# Patient Record
Sex: FEMALE | Race: WHITE
[De-identification: names, ages, dates, MRNs, and addresses within clinical notes are randomized per-mention and may not be internally consistent; named-entity substitution may affect disease eponyms.]

---

## 2019-02-12 ENCOUNTER — HOSPITAL ENCOUNTER (OUTPATIENT)
Dept: HOSPITAL 82 - LAB | Age: 74
Discharge: HOME | End: 2019-02-12
Attending: INTERNAL MEDICINE
Payer: MEDICARE

## 2019-02-12 DIAGNOSIS — R73.9: ICD-10-CM

## 2019-02-12 DIAGNOSIS — E03.9: ICD-10-CM

## 2019-02-12 DIAGNOSIS — E55.9: ICD-10-CM

## 2019-02-12 DIAGNOSIS — R53.83: Primary | ICD-10-CM

## 2019-02-12 DIAGNOSIS — E78.49: ICD-10-CM

## 2019-02-12 DIAGNOSIS — Z79.899: ICD-10-CM

## 2019-02-12 LAB
ALBUMIN SERPL-MCNC: 4.5 G/DL (ref 3.2–5)
ALP SERPL-CCNC: 103 U/L (ref 38–126)
ANION GAP SERPL CALCULATED.3IONS-SCNC: 14 MMOL/L
AST SERPL-CCNC: 38 U/L (ref 9–36)
BASOPHILS NFR BLD AUTO: 1 % (ref 0–3)
BUN SERPL-MCNC: 16 MG/DL (ref 8–23)
BUN/CREAT SERPL: 21
CHLORIDE SERPL-SCNC: 101 MMOL/L (ref 95–108)
CHOLEST SERPL-MCNC: 170 MG/DL (ref 0–199)
CHOLEST/HDLC SERPL: 3.2 {RATIO}
CO2 SERPL-SCNC: 29 MMOL/L (ref 22–30)
CREAT SERPL-MCNC: 0.8 MG/DL (ref 0.5–1)
EOSINOPHIL NFR BLD AUTO: 6 % (ref 0–8)
ERYTHROCYTE [DISTWIDTH] IN BLOOD BY AUTOMATED COUNT: 13.7 % (ref 11.5–15.5)
HCT VFR BLD AUTO: 44.4 % (ref 37–47)
HDLC SERPL-MCNC: 52 MG/DL (ref 40–?)
HGB BLD-MCNC: 14.7 G/DL (ref 12–16)
IMM GRANULOCYTES NFR BLD: 0.4 % (ref 0–5)
LDLC SERPL CALC-MCNC: 94 MG/DL
LYMPHOCYTES NFR BLD: 25 % (ref 15–41)
MCH RBC QN AUTO: 30.2 PG  CALC (ref 26–32)
MCHC RBC AUTO-ENTMCNC: 33.1 G/L CALC (ref 32–36)
MCV RBC AUTO: 91.4 FL  CALC (ref 80–100)
MONOCYTES NFR BLD AUTO: 11 % (ref 2–13)
NEUTROPHILS # BLD AUTO: 4.72 THOU/UL (ref 2–7.15)
NEUTROPHILS NFR BLD AUTO: 56 % (ref 42–76)
PLATELET # BLD AUTO: 466 THOU/UL (ref 130–400)
POTASSIUM SERPL-SCNC: 4.6 MMOL/L (ref 3.5–5.1)
PROT SERPL-MCNC: 7.4 G/DL (ref 6.3–8.2)
RBC # BLD AUTO: 4.86 MILL/UL (ref 4.2–5.6)
SODIUM SERPL-SCNC: 139 MMOL/L (ref 137–146)
TRIGL SERPL-MCNC: 116 MG/DL (ref 30–149)
TSH SERPL DL<=0.05 MIU/L-ACNC: 4.52 UIU/ML (ref 0.47–4.68)
VLDLC SERPL CALC-MCNC: 23 MG/DL

## 2019-03-13 ENCOUNTER — HOSPITAL ENCOUNTER (OUTPATIENT)
Dept: HOSPITAL 82 - ULTRASND | Age: 74
Discharge: HOME | End: 2019-03-13
Attending: INTERNAL MEDICINE
Payer: MEDICARE

## 2019-03-13 DIAGNOSIS — Z90.49: ICD-10-CM

## 2019-03-13 DIAGNOSIS — R79.89: Primary | ICD-10-CM

## 2021-12-31 ENCOUNTER — HOSPITAL ENCOUNTER (EMERGENCY)
Dept: HOSPITAL 82 - ED | Age: 76
Discharge: HOME | End: 2021-12-31
Payer: MEDICARE

## 2021-12-31 VITALS — BODY MASS INDEX: 26.56 KG/M2 | HEIGHT: 63 IN | WEIGHT: 149.91 LBS

## 2021-12-31 VITALS — DIASTOLIC BLOOD PRESSURE: 91 MMHG | SYSTOLIC BLOOD PRESSURE: 137 MMHG

## 2021-12-31 DIAGNOSIS — R10.9: Primary | ICD-10-CM

## 2021-12-31 DIAGNOSIS — R59.0: ICD-10-CM

## 2021-12-31 DIAGNOSIS — I10: ICD-10-CM

## 2021-12-31 DIAGNOSIS — E78.5: ICD-10-CM

## 2021-12-31 LAB
ALBUMIN SERPL-MCNC: 4.3 G/DL (ref 3.2–5)
ALP SERPL-CCNC: 129 U/L (ref 38–126)
ANION GAP SERPL CALCULATED.3IONS-SCNC: 10 MMOL/L
AST SERPL-CCNC: 43 U/L (ref 9–36)
BASOPHILS NFR BLD AUTO: 1 % (ref 0–3)
BILIRUB UR QL STRIP.AUTO: NEGATIVE
BUN SERPL-MCNC: 8 MG/DL (ref 8–23)
BUN/CREAT SERPL: 13
CHLORIDE SERPL-SCNC: 105 MMOL/L (ref 95–108)
CO2 SERPL-SCNC: 27 MMOL/L (ref 22–30)
COLOR UR AUTO: YELLOW
CREAT SERPL-MCNC: 0.6 MG/DL (ref 0.5–1)
EOSINOPHIL NFR BLD AUTO: 3 % (ref 0–8)
ERYTHROCYTE [DISTWIDTH] IN BLOOD BY AUTOMATED COUNT: 12.9 % (ref 11.5–15.5)
GLUCOSE UR STRIP.AUTO-MCNC: NEGATIVE MG/DL
HCT VFR BLD AUTO: 44.9 % (ref 37–47)
HGB BLD-MCNC: 14.9 G/DL (ref 12–16)
HGB UR QL STRIP.AUTO: NEGATIVE
IMM GRANULOCYTES NFR BLD: 0.2 % (ref 0–5)
KETONES UR STRIP.AUTO-MCNC: NEGATIVE MG/DL
LEUKOCYTE ESTERASE UR QL STRIP.AUTO: NEGATIVE
LIPASE SERPL-CCNC: 78 U/L (ref 23–300)
LYMPHOCYTES NFR BLD: 21 % (ref 15–41)
MCH RBC QN AUTO: 30.7 PG  CALC (ref 26–32)
MCHC RBC AUTO-ENTMCNC: 33.2 G/DL CAL (ref 32–36)
MCV RBC AUTO: 92.6 FL  CALC (ref 80–100)
MONOCYTES NFR BLD AUTO: 12 % (ref 2–13)
NEUTROPHILS # BLD AUTO: 5.27 THOU/UL (ref 2–7.15)
NEUTROPHILS NFR BLD AUTO: 63 % (ref 42–76)
NITRITE UR QL STRIP.AUTO: NEGATIVE
PH UR STRIP.AUTO: 6 [PH] (ref 4.5–8)
PLATELET # BLD AUTO: 388 THOU/UL (ref 130–400)
POTASSIUM SERPL-SCNC: 4.1 MMOL/L (ref 3.5–5.1)
PROT SERPL-MCNC: 8 G/DL (ref 6.3–8.2)
PROT UR QL STRIP.AUTO: NEGATIVE MG/DL
RBC # BLD AUTO: 4.85 MILL/UL (ref 4.2–5.6)
SODIUM SERPL-SCNC: 138 MMOL/L (ref 137–146)
SP GR UR STRIP.AUTO: 1.01
UROBILINOGEN UR QL STRIP.AUTO: 0.2 E.U./DL

## 2022-07-15 ENCOUNTER — HOSPITAL ENCOUNTER (EMERGENCY)
Dept: HOSPITAL 82 - ED | Age: 77
Discharge: HOME | End: 2022-07-15
Payer: MEDICARE

## 2022-07-15 VITALS — WEIGHT: 149.91 LBS | HEIGHT: 63 IN | BODY MASS INDEX: 26.56 KG/M2

## 2022-07-15 VITALS — SYSTOLIC BLOOD PRESSURE: 145 MMHG | DIASTOLIC BLOOD PRESSURE: 94 MMHG

## 2022-07-15 DIAGNOSIS — E78.5: ICD-10-CM

## 2022-07-15 DIAGNOSIS — G89.29: ICD-10-CM

## 2022-07-15 DIAGNOSIS — M54.50: Primary | ICD-10-CM

## 2022-07-15 DIAGNOSIS — I10: ICD-10-CM

## 2022-07-15 DIAGNOSIS — Z90.49: ICD-10-CM

## 2022-07-15 LAB
ALBUMIN SERPL-MCNC: 4.4 G/DL (ref 3.2–5)
ALP SERPL-CCNC: 102 U/L (ref 38–126)
ANION GAP SERPL CALCULATED.3IONS-SCNC: 11 MMOL/L
AST SERPL-CCNC: 50 U/L (ref 9–36)
BASOPHILS NFR BLD AUTO: 1 % (ref 0–3)
BILIRUB UR QL STRIP.AUTO: NEGATIVE
BUN SERPL-MCNC: 13 MG/DL (ref 8–23)
BUN/CREAT SERPL: 20
CHLORIDE SERPL-SCNC: 103 MMOL/L (ref 95–108)
CO2 SERPL-SCNC: 24 MMOL/L (ref 22–30)
COLOR UR AUTO: YELLOW
CREAT SERPL-MCNC: 0.7 MG/DL (ref 0.5–1)
EOSINOPHIL NFR BLD AUTO: 2 % (ref 0–8)
ERYTHROCYTE [DISTWIDTH] IN BLOOD BY AUTOMATED COUNT: 12.8 % (ref 11.5–15.5)
GLUCOSE UR STRIP.AUTO-MCNC: NEGATIVE MG/DL
HCT VFR BLD AUTO: 44.9 % (ref 37–47)
HGB BLD-MCNC: 14.9 G/DL (ref 12–16)
HGB UR QL STRIP.AUTO: NEGATIVE
IMM GRANULOCYTES NFR BLD: 0.1 % (ref 0–5)
KETONES UR STRIP.AUTO-MCNC: NEGATIVE MG/DL
LEUKOCYTE ESTERASE UR QL STRIP.AUTO: NEGATIVE
LYMPHOCYTES NFR BLD: 17 % (ref 15–41)
MCH RBC QN AUTO: 30.3 PG  CALC (ref 26–32)
MCHC RBC AUTO-ENTMCNC: 33.2 G/DL CAL (ref 32–36)
MCV RBC AUTO: 91.4 FL  CALC (ref 80–100)
MONOCYTES NFR BLD AUTO: 11 % (ref 2–13)
NEUTROPHILS # BLD AUTO: 5.52 THOU/UL (ref 2–7.15)
NEUTROPHILS NFR BLD AUTO: 69 % (ref 42–76)
NITRITE UR QL STRIP.AUTO: NEGATIVE
PH UR STRIP.AUTO: 7 [PH] (ref 4.5–8)
PLATELET # BLD AUTO: 384 THOU/UL (ref 130–400)
POTASSIUM SERPL-SCNC: 4.1 MMOL/L (ref 3.5–5.1)
PROT SERPL-MCNC: 8 G/DL (ref 6.3–8.2)
PROT UR QL STRIP.AUTO: NEGATIVE MG/DL
RBC # BLD AUTO: 4.91 MILL/UL (ref 4.2–5.6)
SODIUM SERPL-SCNC: 135 MMOL/L (ref 137–146)
SP GR UR STRIP.AUTO: 1.01
UROBILINOGEN UR QL STRIP.AUTO: 0.2 E.U./DL

## 2022-12-13 ENCOUNTER — HOSPITAL ENCOUNTER (OUTPATIENT)
Dept: HOSPITAL 82 - ED | Age: 77
Setting detail: OBSERVATION
LOS: 1 days | Discharge: HOME | End: 2022-12-14
Attending: INTERNAL MEDICINE | Admitting: INTERNAL MEDICINE
Payer: MEDICARE

## 2022-12-13 VITALS — SYSTOLIC BLOOD PRESSURE: 116 MMHG | DIASTOLIC BLOOD PRESSURE: 80 MMHG

## 2022-12-13 VITALS — SYSTOLIC BLOOD PRESSURE: 121 MMHG | DIASTOLIC BLOOD PRESSURE: 78 MMHG

## 2022-12-13 VITALS — DIASTOLIC BLOOD PRESSURE: 76 MMHG | SYSTOLIC BLOOD PRESSURE: 122 MMHG

## 2022-12-13 VITALS — HEIGHT: 63 IN | WEIGHT: 154.32 LBS | BODY MASS INDEX: 27.34 KG/M2

## 2022-12-13 VITALS — DIASTOLIC BLOOD PRESSURE: 82 MMHG | SYSTOLIC BLOOD PRESSURE: 138 MMHG

## 2022-12-13 VITALS — DIASTOLIC BLOOD PRESSURE: 79 MMHG | SYSTOLIC BLOOD PRESSURE: 122 MMHG

## 2022-12-13 VITALS — DIASTOLIC BLOOD PRESSURE: 80 MMHG | SYSTOLIC BLOOD PRESSURE: 117 MMHG

## 2022-12-13 VITALS — SYSTOLIC BLOOD PRESSURE: 139 MMHG | DIASTOLIC BLOOD PRESSURE: 99 MMHG

## 2022-12-13 VITALS — SYSTOLIC BLOOD PRESSURE: 127 MMHG | DIASTOLIC BLOOD PRESSURE: 83 MMHG

## 2022-12-13 VITALS — DIASTOLIC BLOOD PRESSURE: 77 MMHG | SYSTOLIC BLOOD PRESSURE: 118 MMHG

## 2022-12-13 VITALS — SYSTOLIC BLOOD PRESSURE: 126 MMHG | DIASTOLIC BLOOD PRESSURE: 79 MMHG

## 2022-12-13 VITALS — SYSTOLIC BLOOD PRESSURE: 104 MMHG | DIASTOLIC BLOOD PRESSURE: 64 MMHG

## 2022-12-13 VITALS — DIASTOLIC BLOOD PRESSURE: 78 MMHG | SYSTOLIC BLOOD PRESSURE: 114 MMHG

## 2022-12-13 VITALS — SYSTOLIC BLOOD PRESSURE: 138 MMHG | DIASTOLIC BLOOD PRESSURE: 82 MMHG

## 2022-12-13 VITALS — SYSTOLIC BLOOD PRESSURE: 125 MMHG | DIASTOLIC BLOOD PRESSURE: 89 MMHG

## 2022-12-13 VITALS — DIASTOLIC BLOOD PRESSURE: 57 MMHG | SYSTOLIC BLOOD PRESSURE: 101 MMHG

## 2022-12-13 DIAGNOSIS — I10: ICD-10-CM

## 2022-12-13 DIAGNOSIS — E78.5: ICD-10-CM

## 2022-12-13 DIAGNOSIS — R07.9: Primary | ICD-10-CM

## 2022-12-13 DIAGNOSIS — E03.9: ICD-10-CM

## 2022-12-13 DIAGNOSIS — F41.9: ICD-10-CM

## 2022-12-13 LAB
ALBUMIN SERPL-MCNC: 4.5 G/DL (ref 3.2–5)
ALP SERPL-CCNC: 95 U/L (ref 38–126)
ANION GAP SERPL CALCULATED.3IONS-SCNC: 15 MMOL/L
AST SERPL-CCNC: 39 U/L (ref 9–36)
BASOPHILS NFR BLD AUTO: 1 % (ref 0–3)
BILIRUB UR QL STRIP.AUTO: NEGATIVE
BUN SERPL-MCNC: 13 MG/DL (ref 8–23)
BUN/CREAT SERPL: 20
CHLORIDE SERPL-SCNC: 102 MMOL/L (ref 95–108)
CO2 SERPL-SCNC: 26 MMOL/L (ref 22–30)
COLOR UR AUTO: YELLOW
CREAT SERPL-MCNC: 0.7 MG/DL (ref 0.5–1)
EOSINOPHIL NFR BLD AUTO: 1 % (ref 0–8)
ERYTHROCYTE [DISTWIDTH] IN BLOOD BY AUTOMATED COUNT: 13 % (ref 11.5–15.5)
GLUCOSE UR STRIP.AUTO-MCNC: NEGATIVE MG/DL
HCT VFR BLD AUTO: 43.1 % (ref 37–47)
HGB BLD-MCNC: 14.6 G/DL (ref 12–16)
HGB UR QL STRIP.AUTO: NEGATIVE
IMM GRANULOCYTES NFR BLD: 0.2 % (ref 0–5)
INR PPP: 1 RATIO (ref 0.7–1.3)
KETONES UR STRIP.AUTO-MCNC: NEGATIVE MG/DL
LEUKOCYTE ESTERASE UR QL STRIP.AUTO: NEGATIVE
LIPASE SERPL-CCNC: 47 U/L (ref 23–300)
LYMPHOCYTES NFR BLD: 17 % (ref 15–41)
MCH RBC QN AUTO: 30.6 PG  CALC (ref 26–32)
MCHC RBC AUTO-ENTMCNC: 33.9 G/DL CAL (ref 32–36)
MCV RBC AUTO: 90.4 FL  CALC (ref 80–100)
MONOCYTES NFR BLD AUTO: 15 % (ref 2–13)
NEUTROPHILS # BLD AUTO: 7.25 THOU/UL (ref 2–7.15)
NEUTROPHILS NFR BLD AUTO: 66 % (ref 42–76)
NITRITE UR QL STRIP.AUTO: NEGATIVE
PH UR STRIP.AUTO: 6 [PH] (ref 4.5–8)
PLATELET # BLD AUTO: 366 THOU/UL (ref 130–400)
POTASSIUM SERPL-SCNC: 4.4 MMOL/L (ref 3.5–5.1)
PROT SERPL-MCNC: 8 G/DL (ref 6.3–8.2)
PROT UR QL STRIP.AUTO: NEGATIVE MG/DL
PROTHROMBIN TIME: 10 SECONDS (ref 9–12.5)
RBC # BLD AUTO: 4.77 MILL/UL (ref 4.2–5.6)
SODIUM SERPL-SCNC: 139 MMOL/L (ref 137–146)
SP GR UR STRIP.AUTO: 1.02
UROBILINOGEN UR QL STRIP.AUTO: 0.2 E.U./DL

## 2022-12-14 VITALS — DIASTOLIC BLOOD PRESSURE: 69 MMHG | SYSTOLIC BLOOD PRESSURE: 114 MMHG

## 2022-12-14 VITALS — SYSTOLIC BLOOD PRESSURE: 114 MMHG | DIASTOLIC BLOOD PRESSURE: 69 MMHG

## 2022-12-14 LAB
CHOLEST SERPL-MCNC: 224 MG/DL (ref 0–199)
CHOLEST/HDLC SERPL: 5 {RATIO}
HDLC SERPL-MCNC: 45 MG/DL (ref 40–?)
LDLC SERPL CALC-MCNC: 155 MG/DL
MAGNESIUM SERPL-MCNC: 2 MG/DL (ref 1.6–2.3)
TRIGL SERPL-MCNC: 118 MG/DL (ref 30–149)
VLDLC SERPL CALC-MCNC: 24 MG/DL

## 2025-07-29 ENCOUNTER — APPOINTMENT (OUTPATIENT)
Dept: URBAN - NONMETROPOLITAN AREA CLINIC 10 | Facility: CLINIC | Age: 80
Setting detail: DERMATOLOGY
End: 2025-07-29

## 2025-07-29 DIAGNOSIS — L57.8 OTHER SKIN CHANGES DUE TO CHRONIC EXPOSURE TO NONIONIZING RADIATION: ICD-10-CM

## 2025-07-29 DIAGNOSIS — D49.2 NEOPLASM OF UNSPECIFIED BEHAVIOR OF BONE, SOFT TISSUE, AND SKIN: ICD-10-CM

## 2025-07-29 PROCEDURE — ? COUNSELING

## 2025-07-29 PROCEDURE — ? PRESCRIPTION

## 2025-07-29 PROCEDURE — ? BIOPSY BY SHAVE METHOD

## 2025-07-29 PROCEDURE — ?: Mod: 25

## 2025-07-29 PROCEDURE — ?

## 2025-07-29 RX ORDER — MUPIROCIN 20 MG/G
1 OINTMENT TOPICAL QD
Qty: 22 | Refills: 0 | Status: ERX | COMMUNITY
Start: 2025-07-29

## 2025-07-29 RX ADMIN — MUPIROCIN 1: 20 OINTMENT TOPICAL at 00:00

## 2025-07-29 ASSESSMENT — LOCATION ZONE DERM
LOCATION ZONE: HAND
LOCATION ZONE: FINGER

## 2025-07-29 ASSESSMENT — LOCATION SIMPLE DESCRIPTION DERM
LOCATION SIMPLE: LEFT SMALL FINGER
LOCATION SIMPLE: LEFT HAND

## 2025-07-29 ASSESSMENT — LOCATION DETAILED DESCRIPTION DERM
LOCATION DETAILED: LEFT DISTAL ULNAR DORSAL SMALL FINGER
LOCATION DETAILED: LEFT RADIAL DORSAL HAND

## 2025-07-29 NOTE — PROCEDURE: BIOPSY BY SHAVE METHOD
Body Location Override (Optional - Billing Will Still Be Based On Selected Body Map Location If Applicable): left pinky finger
Detail Level: Detailed
Depth Of Biopsy: dermis
Was A Bandage Applied: Yes
Size Of Lesion In Cm: 0.4
X Size Of Lesion In Cm: 0
Biopsy Type: H and E
Biopsy Method: sterile single edge surgical blade
Anesthesia Type: 1% lidocaine with epinephrine and a 1:10 solution of 8.4% sodium bicarbonate
Anesthesia Volume In Cc: 0.5
Hemostasis: Electrocautery
Wound Care: Aquaphor
Dressing: bandage
Destruction After The Procedure: No
Type Of Destruction Used: Curettage
Curettage Text: The wound bed was treated with curettage after the biopsy was performed.
Cryotherapy Text: The wound bed was treated with cryotherapy after the biopsy was performed.
Electrodesiccation Text: The wound bed was treated with electrodesiccation after the biopsy was performed.
Electrodesiccation And Curettage Text: The wound bed was treated with electrodesiccation and curettage after the biopsy was performed.
Silver Nitrate Text: The wound bed was treated with silver nitrate after the biopsy was performed.
Lab: [id number]
Medical Necessity Information: It is in your best interest to select a reason for this procedure from the list below. All of these items fulfill various CMS LCD requirements except the new and changing color options.
Consent: Written consent was obtained and risks were reviewed including but not limited to scarring, infection, bleeding, scabbing, incomplete removal, nerve damage and allergy to anesthesia.
Post-Care Instructions: I reviewed with the patient in detail post-care instructions. Patient is to keep the biopsy site dry overnight, and then apply bacitracin twice daily until healed. Patient may apply hydrogen peroxide soaks to remove any crusting.
Notification Instructions: Patient will be notified of biopsy results. However, patient instructed to call the office if not contacted within 2 weeks.
Billing Type: Third-Party Bill
Information: Selecting Yes will display possible errors in your note based on the variables you have selected. This validation is only offered as a suggestion for you. PLEASE NOTE THAT THE VALIDATION TEXT WILL BE REMOVED WHEN YOU FINALIZE YOUR NOTE. IF YOU WANT TO FAX A PRELIMINARY NOTE YOU WILL NEED TO TOGGLE THIS TO 'NO' IF YOU DO NOT WANT IT IN YOUR FAXED NOTE.

## 2025-08-12 ENCOUNTER — RX ONLY (RX ONLY)
Age: 80
End: 2025-08-12

## 2025-08-12 ENCOUNTER — APPOINTMENT (OUTPATIENT)
Dept: URBAN - NONMETROPOLITAN AREA CLINIC 10 | Facility: CLINIC | Age: 80
Setting detail: DERMATOLOGY
End: 2025-08-12

## 2025-08-12 DIAGNOSIS — Z48.817 ENCOUNTER FOR SURGICAL AFTERCARE FOLLOWING SURGERY ON THE SKIN AND SUBCUTANEOUS TISSUE: ICD-10-CM

## 2025-08-12 PROCEDURE — ? POST-OP WOUND EVALUATION

## 2025-08-12 PROCEDURE — ? COUNSELING

## 2025-08-12 RX ORDER — FLUOROURACIL 2 G/40G
CREAM TOPICAL
Qty: 40 | Refills: 0 | Status: ERX | COMMUNITY
Start: 2025-08-12

## 2025-08-12 ASSESSMENT — LOCATION ZONE DERM: LOCATION ZONE: FINGER

## 2025-08-12 ASSESSMENT — LOCATION SIMPLE DESCRIPTION DERM: LOCATION SIMPLE: LEFT SMALL FINGER

## 2025-08-12 ASSESSMENT — LOCATION DETAILED DESCRIPTION DERM: LOCATION DETAILED: LEFT SMALL FINGERNAIL
